# Patient Record
Sex: MALE | Race: WHITE | URBAN - METROPOLITAN AREA
[De-identification: names, ages, dates, MRNs, and addresses within clinical notes are randomized per-mention and may not be internally consistent; named-entity substitution may affect disease eponyms.]

---

## 2023-01-31 ENCOUNTER — OFFICE VISIT (OUTPATIENT)
Dept: URBAN - METROPOLITAN AREA CLINIC 43 | Facility: CLINIC | Age: 33
End: 2023-01-31
Payer: OTHER GOVERNMENT

## 2023-01-31 DIAGNOSIS — H52.13 MYOPIA, BILATERAL: Primary | ICD-10-CM

## 2023-01-31 PROCEDURE — 99204 OFFICE O/P NEW MOD 45 MIN: CPT

## 2023-01-31 ASSESSMENT — INTRAOCULAR PRESSURE
OD: 12
OS: 12

## 2023-01-31 ASSESSMENT — VISUAL ACUITY
OS: 20/20
OD: 20/20

## 2023-01-31 NOTE — IMPRESSION/PLAN
Impression: Myopia, bilateral: H52.13. Plan: Released SRx to pt. Recommend polycarbonate. RTC if unable to adapt.

## 2023-02-23 ENCOUNTER — OFFICE VISIT (OUTPATIENT)
Dept: URBAN - METROPOLITAN AREA CLINIC 56 | Facility: LOCATION | Age: 33
End: 2023-02-23
Payer: OTHER GOVERNMENT

## 2023-02-23 DIAGNOSIS — H52.13 MYOPIA, BILATERAL: Primary | ICD-10-CM

## 2023-02-23 PROCEDURE — 92015 DETERMINE REFRACTIVE STATE: CPT

## 2023-02-23 ASSESSMENT — VISUAL ACUITY
OS: 20/20
OD: 20/20

## 2023-02-23 ASSESSMENT — KERATOMETRY
OD: 44.24
OS: 43.17